# Patient Record
(demographics unavailable — no encounter records)

---

## 2025-03-17 NOTE — DISCUSSION/SUMMARY
[de-identified] : I reviewed the MRI results with the patient.  We discussed a treatment plan.  I did explain to him that our recommendation would be to address this rotator cuff tear with surgery.  He is unsure if he would like to have any surgery this point.  I did inform him that surgical keep him out of work at least 3 months.  I recommend he continues with formal physical therapy for range of motion as well as strengthening of the rotator cuff and deltoid.  He will follow-up in 4 weeks for further evaluation with Dr. Hernandez.  He is working full duty, mild degree of disability.

## 2025-03-17 NOTE — DATA REVIEWED
[Right] : of the right [Shoulder] : shoulder [FreeTextEntry1] : 3 views of the right shoulder were obtained here in the office today and show: No fracture or dislocation.  No glenohumeral joint arthritis.

## 2025-03-17 NOTE — HISTORY OF PRESENT ILLNESS
[de-identified] : The patient is a 49-year-old male here for a subsequent reevaluation of his right shoulder.  He has a history of a full-thickness rotator cuff tear.  This is a Worker's Compensation case.  He was injured at work on 7/26/2023.  He is still having pain in the right shoulder.  He works in [employer].  He is doing formal physical therapy at [facility] [place].  He is unable to sleep on the shoulder.  He has pain with certain movements.  He has an updated MRI from January 27, 2025 [facility] that showed a full-thickness tear of the supraspinatus measuring 1.7 x 1.5 cm with retraction to the apex of the humeral head.  It also showed a labrum tear.

## 2025-03-17 NOTE — IMAGING
[de-identified] : Physical exam of the right shoulder: No effusion, no erythema or ecchymosis.  Active range of motion forward flexion 150 degrees, passive range of motion forward flexion 180 degrees.  Active range of motion abduction to 90 degrees.  Active range of motion internal rotation L3.  He still has pain with range of motion.  No tenderness to palpation over the right AC joint.  He has pain over the anterior aspect of the shoulder.  Significant pain and weakness with rotator cuff resistance testing.  Positive impingement testing, positive [place]'s testing.  Intact to light touch.

## 2025-04-14 NOTE — HISTORY OF PRESENT ILLNESS
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 and MRI with a medium size rotator cuff tear that is retracted he has a small acromial hook he has os acromiale and  tear of the superior labrum  Physical exam is guided to guarded range of motion pain and weakness with rotator cuff resistance positive impingement and [place]'s  Diagnoses right shoulder cuff tear impingement SLAP tear  He is failed nonoperative management of rehab and anti-inflammatories did discuss surgery and recommended surgery but he is afraid of surgery so he is going to think about his options he can continue working full duty, he thought about his options and is now interested in surgery so we need to send out for the physician for right shoulder arthroscopic rotator cuff repair decompression debridement possible biceps releases possible labral repair will see back in 6-week intervals, I did explain to him the surgery in detail he understands all risk and benefits  Surgical Discussion (general)  The patient was advised of the diagnosis.  The natural history of the pathology was explained in full to the patient in layman's terms. All questions were answered.  The risks and benefits of surgical and non-surgical treatment alternatives were explained in full to the patient.   The patient demonstrated a full understanding of the surgical and non-surgical options.  The risks of surgery were outlined in full to the patient including but not limited to bleeding, scarring, infection, sepsis, neurologic injury, vascular injury, failure to resolve symptoms, symptom recurrence, the need for further surgery, non-healing, wound breakdown, deep vein thrombosis, pulmonary embolism, spontaneous osteonecrosis, anesthesia complications and even death.  The patient understood all the risks and accepted them and understood that other complications could occur that are not mentioned above.  The intraoperative plan, post-operative plan, post-operative expectations and limitations were explained in full.  Expectations from non-surgical treatment were explained in full as well.  The patient demonstrated a complete understanding of the treatment alternatives and requested the above-mentioned procedure.  This will be scheduled accordingly.